# Patient Record
Sex: FEMALE | Race: WHITE | Employment: FULL TIME | URBAN - NONMETROPOLITAN AREA
[De-identification: names, ages, dates, MRNs, and addresses within clinical notes are randomized per-mention and may not be internally consistent; named-entity substitution may affect disease eponyms.]

---

## 2020-10-26 ENCOUNTER — HOSPITAL ENCOUNTER (EMERGENCY)
Facility: HOSPITAL | Age: 60
Discharge: HOME | End: 2020-10-26
Attending: EMERGENCY MEDICINE | Admitting: EMERGENCY MEDICINE
Payer: COMMERCIAL

## 2020-10-26 ENCOUNTER — APPOINTMENT (EMERGENCY)
Dept: RADIOLOGY | Facility: HOSPITAL | Age: 60
End: 2020-10-26
Payer: COMMERCIAL

## 2020-10-26 VITALS
SYSTOLIC BLOOD PRESSURE: 170 MMHG | TEMPERATURE: 98.2 F | OXYGEN SATURATION: 98 % | HEART RATE: 61 BPM | WEIGHT: 293 LBS | RESPIRATION RATE: 18 BRPM | HEIGHT: 67 IN | BODY MASS INDEX: 45.99 KG/M2 | DIASTOLIC BLOOD PRESSURE: 72 MMHG

## 2020-10-26 DIAGNOSIS — M79.642 HAND PAIN, LEFT: Primary | ICD-10-CM

## 2020-10-26 DIAGNOSIS — M79.81 ACHENBACH'S SYNDROME: ICD-10-CM

## 2020-10-26 LAB
ANION GAP SERPL CALCULATED.3IONS-SCNC: 6 MMOL/L (ref 4–13)
APTT PPP: 27 SECONDS (ref 23–37)
BASOPHILS # BLD AUTO: 0.05 THOUSANDS/ΜL (ref 0–0.1)
BASOPHILS NFR BLD AUTO: 1 % (ref 0–1)
BUN SERPL-MCNC: 15 MG/DL (ref 5–25)
CALCIUM SERPL-MCNC: 9.1 MG/DL (ref 8.3–10.1)
CHLORIDE SERPL-SCNC: 104 MMOL/L (ref 100–108)
CO2 SERPL-SCNC: 30 MMOL/L (ref 21–32)
CREAT SERPL-MCNC: 0.76 MG/DL (ref 0.6–1.3)
EOSINOPHIL # BLD AUTO: 0.19 THOUSAND/ΜL (ref 0–0.61)
EOSINOPHIL NFR BLD AUTO: 2 % (ref 0–6)
ERYTHROCYTE [DISTWIDTH] IN BLOOD BY AUTOMATED COUNT: 13.7 % (ref 11.6–15.1)
GFR SERPL CREATININE-BSD FRML MDRD: 86 ML/MIN/1.73SQ M
GLUCOSE SERPL-MCNC: 110 MG/DL (ref 65–140)
HCT VFR BLD AUTO: 43.9 % (ref 34.8–46.1)
HGB BLD-MCNC: 14.4 G/DL (ref 11.5–15.4)
IMM GRANULOCYTES # BLD AUTO: 0.04 THOUSAND/UL (ref 0–0.2)
IMM GRANULOCYTES NFR BLD AUTO: 1 % (ref 0–2)
INR PPP: 0.89 (ref 0.84–1.19)
LYMPHOCYTES # BLD AUTO: 2.47 THOUSANDS/ΜL (ref 0.6–4.47)
LYMPHOCYTES NFR BLD AUTO: 29 % (ref 14–44)
MCH RBC QN AUTO: 27.6 PG (ref 26.8–34.3)
MCHC RBC AUTO-ENTMCNC: 32.8 G/DL (ref 31.4–37.4)
MCV RBC AUTO: 84 FL (ref 82–98)
MONOCYTES # BLD AUTO: 0.59 THOUSAND/ΜL (ref 0.17–1.22)
MONOCYTES NFR BLD AUTO: 7 % (ref 4–12)
NEUTROPHILS # BLD AUTO: 5.24 THOUSANDS/ΜL (ref 1.85–7.62)
NEUTS SEG NFR BLD AUTO: 60 % (ref 43–75)
NRBC BLD AUTO-RTO: 0 /100 WBCS
PLATELET # BLD AUTO: 232 THOUSANDS/UL (ref 149–390)
PMV BLD AUTO: 12.3 FL (ref 8.9–12.7)
POTASSIUM SERPL-SCNC: 3.8 MMOL/L (ref 3.5–5.3)
PROTHROMBIN TIME: 11.9 SECONDS (ref 11.6–14.5)
RBC # BLD AUTO: 5.21 MILLION/UL (ref 3.81–5.12)
SODIUM SERPL-SCNC: 140 MMOL/L (ref 136–145)
WBC # BLD AUTO: 8.58 THOUSAND/UL (ref 4.31–10.16)

## 2020-10-26 PROCEDURE — 85025 COMPLETE CBC W/AUTO DIFF WBC: CPT | Performed by: EMERGENCY MEDICINE

## 2020-10-26 PROCEDURE — 73130 X-RAY EXAM OF HAND: CPT

## 2020-10-26 PROCEDURE — 36415 COLL VENOUS BLD VENIPUNCTURE: CPT | Performed by: EMERGENCY MEDICINE

## 2020-10-26 PROCEDURE — 99283 EMERGENCY DEPT VISIT LOW MDM: CPT

## 2020-10-26 PROCEDURE — 99285 EMERGENCY DEPT VISIT HI MDM: CPT | Performed by: EMERGENCY MEDICINE

## 2020-10-26 PROCEDURE — 80048 BASIC METABOLIC PNL TOTAL CA: CPT | Performed by: EMERGENCY MEDICINE

## 2020-10-26 PROCEDURE — 85730 THROMBOPLASTIN TIME PARTIAL: CPT | Performed by: EMERGENCY MEDICINE

## 2020-10-26 PROCEDURE — 85610 PROTHROMBIN TIME: CPT | Performed by: EMERGENCY MEDICINE

## 2021-03-10 DIAGNOSIS — Z23 ENCOUNTER FOR IMMUNIZATION: ICD-10-CM

## 2025-05-23 ENCOUNTER — APPOINTMENT (EMERGENCY)
Dept: CT IMAGING | Facility: HOSPITAL | Age: 65
DRG: 069 | End: 2025-05-23
Payer: COMMERCIAL

## 2025-05-23 ENCOUNTER — APPOINTMENT (INPATIENT)
Dept: NON INVASIVE DIAGNOSTICS | Facility: HOSPITAL | Age: 65
DRG: 069 | End: 2025-05-23
Payer: COMMERCIAL

## 2025-05-23 ENCOUNTER — HOSPITAL ENCOUNTER (INPATIENT)
Facility: HOSPITAL | Age: 65
LOS: 1 days | Discharge: HOME | DRG: 069 | End: 2025-05-24
Attending: EMERGENCY MEDICINE | Admitting: INTERNAL MEDICINE
Payer: COMMERCIAL

## 2025-05-23 ENCOUNTER — OFFICE VISIT (OUTPATIENT)
Dept: URGENT CARE | Facility: CLINIC | Age: 65
End: 2025-05-23
Payer: COMMERCIAL

## 2025-05-23 ENCOUNTER — APPOINTMENT (INPATIENT)
Dept: MRI IMAGING | Facility: HOSPITAL | Age: 65
DRG: 069 | End: 2025-05-23
Payer: COMMERCIAL

## 2025-05-23 VITALS
RESPIRATION RATE: 18 BRPM | TEMPERATURE: 97.3 F | HEART RATE: 68 BPM | OXYGEN SATURATION: 97 % | SYSTOLIC BLOOD PRESSURE: 170 MMHG | DIASTOLIC BLOOD PRESSURE: 98 MMHG

## 2025-05-23 DIAGNOSIS — R29.90 STROKE-LIKE SYMPTOMS: Primary | ICD-10-CM

## 2025-05-23 DIAGNOSIS — R20.0 RIGHT SIDED NUMBNESS: Primary | ICD-10-CM

## 2025-05-23 DIAGNOSIS — G45.9 TIA (TRANSIENT ISCHEMIC ATTACK): ICD-10-CM

## 2025-05-23 PROBLEM — E66.01 MORBID OBESITY (HCC): Status: ACTIVE | Noted: 2025-05-23

## 2025-05-23 PROBLEM — I10 HYPERTENSION: Status: ACTIVE | Noted: 2025-05-23

## 2025-05-23 LAB
2HR DELTA HS TROPONIN: 2 NG/L
4HR DELTA HS TROPONIN: 2 NG/L
ANION GAP SERPL CALCULATED.3IONS-SCNC: 9 MMOL/L (ref 4–13)
APTT PPP: 25 SECONDS (ref 23–34)
ATRIAL RATE: 67 BPM
BSA FOR ECHO PROCEDURE: 2.35 M2
BUN SERPL-MCNC: 10 MG/DL (ref 5–25)
CALCIUM SERPL-MCNC: 9.5 MG/DL (ref 8.4–10.2)
CARDIAC TROPONIN I PNL SERPL HS: 3 NG/L (ref ?–50)
CARDIAC TROPONIN I PNL SERPL HS: 5 NG/L (ref ?–50)
CARDIAC TROPONIN I PNL SERPL HS: 5 NG/L (ref ?–50)
CHLORIDE SERPL-SCNC: 103 MMOL/L (ref 96–108)
CO2 SERPL-SCNC: 26 MMOL/L (ref 21–32)
CREAT SERPL-MCNC: 0.66 MG/DL (ref 0.6–1.3)
E WAVE DECELERATION TIME: 139 MS
E/A RATIO: 0.67
ERYTHROCYTE [DISTWIDTH] IN BLOOD BY AUTOMATED COUNT: 13 % (ref 11.6–15.1)
FRACTIONAL SHORTENING: 40 (ref 28–44)
GFR SERPL CREATININE-BSD FRML MDRD: 94 ML/MIN/1.73SQ M
GLUCOSE SERPL-MCNC: 172 MG/DL (ref 65–140)
GLUCOSE SERPL-MCNC: 197 MG/DL (ref 65–140)
HCT VFR BLD AUTO: 45.3 % (ref 34.8–46.1)
HGB BLD-MCNC: 14.9 G/DL (ref 11.5–15.4)
INR PPP: 0.92 (ref 0.85–1.19)
INTERVENTRICULAR SEPTUM IN DIASTOLE (PARASTERNAL SHORT AXIS VIEW): 1.9 CM
INTERVENTRICULAR SEPTUM: 1.9 CM (ref 0.6–1.1)
LAAS-AP2: 18 CM2
LAAS-AP4: 17.1 CM2
LEFT ATRIUM AREA SYSTOLE SINGLE PLANE A4C: 16.4 CM2
LEFT ATRIUM VOLUME (MOD BIPLANE): 52 ML
LEFT ATRIUM VOLUME INDEX (MOD BIPLANE): 22.1 ML/M2
LEFT INTERNAL DIMENSION IN SYSTOLE: 2.6 CM (ref 2.1–4)
LEFT VENTRICULAR INTERNAL DIMENSION IN DIASTOLE: 4.3 CM (ref 3.5–6)
LEFT VENTRICULAR POSTERIOR WALL IN END DIASTOLE: 1.9 CM
LEFT VENTRICULAR STROKE VOLUME: 57 ML
LV EF US.2D.A4C+ESTIMATED: 71 %
LVSV (TEICH): 57 ML
MCH RBC QN AUTO: 28 PG (ref 26.8–34.3)
MCHC RBC AUTO-ENTMCNC: 32.9 G/DL (ref 31.4–37.4)
MCV RBC AUTO: 85 FL (ref 82–98)
MV E'TISSUE VEL-LAT: 10 CM/S
MV E'TISSUE VEL-SEP: 8 CM/S
MV PEAK A VEL: 0.79 M/S
MV PEAK E VEL: 53 CM/S
MV STENOSIS PRESSURE HALF TIME: 40 MS
MV VALVE AREA P 1/2 METHOD: 5.5
P AXIS: 50 DEGREES
PLATELET # BLD AUTO: 200 THOUSANDS/UL (ref 149–390)
PMV BLD AUTO: 12.5 FL (ref 8.9–12.7)
POTASSIUM SERPL-SCNC: 4.3 MMOL/L (ref 3.5–5.3)
PR INTERVAL: 204 MS
PROTHROMBIN TIME: 12.8 SECONDS (ref 12.3–15)
QRS AXIS: 29 DEGREES
QRSD INTERVAL: 106 MS
QT INTERVAL: 418 MS
QTC INTERVAL: 441 MS
RBC # BLD AUTO: 5.32 MILLION/UL (ref 3.81–5.12)
RIGHT ATRIUM AREA SYSTOLE A4C: 14.4 CM2
RIGHT VENTRICLE ID DIMENSION: 4.5 CM
SL CV LEFT ATRIUM LENGTH A2C: 4.7 CM
SL CV LV EF: 70
SL CV PED ECHO LEFT VENTRICLE DIASTOLIC VOLUME (MOD BIPLANE) 2D: 82 ML
SL CV PED ECHO LEFT VENTRICLE SYSTOLIC VOLUME (MOD BIPLANE) 2D: 25 ML
SODIUM SERPL-SCNC: 138 MMOL/L (ref 135–147)
T WAVE AXIS: 23 DEGREES
TRICUSPID ANNULAR PLANE SYSTOLIC EXCURSION: 2.7 CM
VENTRICULAR RATE: 67 BPM
WBC # BLD AUTO: 7.83 THOUSAND/UL (ref 4.31–10.16)

## 2025-05-23 PROCEDURE — 80048 BASIC METABOLIC PNL TOTAL CA: CPT | Performed by: EMERGENCY MEDICINE

## 2025-05-23 PROCEDURE — 99223 1ST HOSP IP/OBS HIGH 75: CPT | Performed by: INTERNAL MEDICINE

## 2025-05-23 PROCEDURE — 84484 ASSAY OF TROPONIN QUANT: CPT | Performed by: EMERGENCY MEDICINE

## 2025-05-23 PROCEDURE — 70551 MRI BRAIN STEM W/O DYE: CPT

## 2025-05-23 PROCEDURE — 93306 TTE W/DOPPLER COMPLETE: CPT | Performed by: INTERNAL MEDICINE

## 2025-05-23 PROCEDURE — 70496 CT ANGIOGRAPHY HEAD: CPT

## 2025-05-23 PROCEDURE — 93005 ELECTROCARDIOGRAM TRACING: CPT

## 2025-05-23 PROCEDURE — 36415 COLL VENOUS BLD VENIPUNCTURE: CPT | Performed by: EMERGENCY MEDICINE

## 2025-05-23 PROCEDURE — 85730 THROMBOPLASTIN TIME PARTIAL: CPT | Performed by: EMERGENCY MEDICINE

## 2025-05-23 PROCEDURE — 85027 COMPLETE CBC AUTOMATED: CPT | Performed by: EMERGENCY MEDICINE

## 2025-05-23 PROCEDURE — 99285 EMERGENCY DEPT VISIT HI MDM: CPT | Performed by: EMERGENCY MEDICINE

## 2025-05-23 PROCEDURE — 70498 CT ANGIOGRAPHY NECK: CPT

## 2025-05-23 PROCEDURE — 99283 EMERGENCY DEPT VISIT LOW MDM: CPT | Performed by: PHYSICIAN ASSISTANT

## 2025-05-23 PROCEDURE — 99285 EMERGENCY DEPT VISIT HI MDM: CPT

## 2025-05-23 PROCEDURE — G0382 LEV 3 HOSP TYPE B ED VISIT: HCPCS | Performed by: PHYSICIAN ASSISTANT

## 2025-05-23 PROCEDURE — 82948 REAGENT STRIP/BLOOD GLUCOSE: CPT

## 2025-05-23 PROCEDURE — 93306 TTE W/DOPPLER COMPLETE: CPT

## 2025-05-23 PROCEDURE — 84484 ASSAY OF TROPONIN QUANT: CPT | Performed by: INTERNAL MEDICINE

## 2025-05-23 PROCEDURE — 93010 ELECTROCARDIOGRAM REPORT: CPT | Performed by: INTERNAL MEDICINE

## 2025-05-23 PROCEDURE — 85610 PROTHROMBIN TIME: CPT | Performed by: EMERGENCY MEDICINE

## 2025-05-23 RX ORDER — ENOXAPARIN SODIUM 100 MG/ML
40 INJECTION SUBCUTANEOUS EVERY 12 HOURS SCHEDULED
Status: DISCONTINUED | OUTPATIENT
Start: 2025-05-23 | End: 2025-05-24 | Stop reason: HOSPADM

## 2025-05-23 RX ORDER — AMLODIPINE BESYLATE 5 MG/1
5 TABLET ORAL DAILY
Status: DISCONTINUED | OUTPATIENT
Start: 2025-05-24 | End: 2025-05-24 | Stop reason: HOSPADM

## 2025-05-23 RX ORDER — ASPIRIN 81 MG/1
81 TABLET, CHEWABLE ORAL DAILY
Status: DISCONTINUED | OUTPATIENT
Start: 2025-05-24 | End: 2025-05-23

## 2025-05-23 RX ORDER — CLOPIDOGREL BISULFATE 75 MG/1
300 TABLET ORAL ONCE
Status: COMPLETED | OUTPATIENT
Start: 2025-05-23 | End: 2025-05-23

## 2025-05-23 RX ORDER — METOPROLOL TARTRATE 25 MG/1
25 TABLET, FILM COATED ORAL EVERY 12 HOURS SCHEDULED
Status: DISCONTINUED | OUTPATIENT
Start: 2025-05-23 | End: 2025-05-24 | Stop reason: HOSPADM

## 2025-05-23 RX ORDER — ACETAMINOPHEN 325 MG/1
650 TABLET ORAL EVERY 6 HOURS PRN
Status: DISCONTINUED | OUTPATIENT
Start: 2025-05-23 | End: 2025-05-24 | Stop reason: HOSPADM

## 2025-05-23 RX ORDER — ATORVASTATIN CALCIUM 10 MG/1
10 TABLET, FILM COATED ORAL
Status: ON HOLD | COMMUNITY
End: 2025-05-24

## 2025-05-23 RX ORDER — AMLODIPINE BESYLATE 5 MG/1
5 TABLET ORAL DAILY
COMMUNITY

## 2025-05-23 RX ORDER — METOPROLOL TARTRATE 25 MG/1
25 TABLET, FILM COATED ORAL EVERY 12 HOURS
COMMUNITY
Start: 2025-02-03

## 2025-05-23 RX ORDER — ASPIRIN 81 MG/1
81 TABLET ORAL DAILY
Status: ON HOLD | COMMUNITY
Start: 2025-02-28 | End: 2025-05-24

## 2025-05-23 RX ORDER — ASPIRIN 81 MG/1
324 TABLET, CHEWABLE ORAL ONCE
Status: COMPLETED | OUTPATIENT
Start: 2025-05-23 | End: 2025-05-23

## 2025-05-23 RX ORDER — ATORVASTATIN CALCIUM 40 MG/1
40 TABLET, FILM COATED ORAL
Status: DISCONTINUED | OUTPATIENT
Start: 2025-05-23 | End: 2025-05-24 | Stop reason: HOSPADM

## 2025-05-23 RX ORDER — LORAZEPAM 2 MG/ML
1 INJECTION INTRAMUSCULAR ONCE
Status: COMPLETED | OUTPATIENT
Start: 2025-05-23 | End: 2025-05-23

## 2025-05-23 RX ORDER — ASPIRIN 81 MG/1
81 TABLET ORAL DAILY
Status: DISCONTINUED | OUTPATIENT
Start: 2025-05-24 | End: 2025-05-24 | Stop reason: HOSPADM

## 2025-05-23 RX ORDER — ENOXAPARIN SODIUM 100 MG/ML
40 INJECTION SUBCUTANEOUS DAILY
Status: DISCONTINUED | OUTPATIENT
Start: 2025-05-23 | End: 2025-05-23

## 2025-05-23 RX ORDER — ATORVASTATIN CALCIUM 40 MG/1
40 TABLET, FILM COATED ORAL EVERY EVENING
Status: DISCONTINUED | OUTPATIENT
Start: 2025-05-23 | End: 2025-05-23 | Stop reason: SDUPTHER

## 2025-05-23 RX ADMIN — ATORVASTATIN CALCIUM 40 MG: 40 TABLET, FILM COATED ORAL at 16:41

## 2025-05-23 RX ADMIN — IOHEXOL 85 ML: 350 INJECTION, SOLUTION INTRAVENOUS at 12:59

## 2025-05-23 RX ADMIN — METOPROLOL TARTRATE 25 MG: 25 TABLET, FILM COATED ORAL at 20:11

## 2025-05-23 RX ADMIN — ASPIRIN 324 MG: 81 TABLET, CHEWABLE ORAL at 13:29

## 2025-05-23 RX ADMIN — LORAZEPAM 1 MG: 2 INJECTION INTRAMUSCULAR; INTRAVENOUS at 14:05

## 2025-05-23 RX ADMIN — ENOXAPARIN SODIUM 40 MG: 40 INJECTION SUBCUTANEOUS at 20:11

## 2025-05-23 RX ADMIN — CLOPIDOGREL 300 MG: 75 TABLET ORAL at 13:29

## 2025-05-23 NOTE — ASSESSMENT & PLAN NOTE
Left voicemail message for patient to return call  Clinic number provided for call back    Attempt #2   Patient presented with transient right upper extremity numbness and slurred speech while talking to her   Symptoms resolved within 10 minutes  Neuroexam nonfocal  NIHSS on admission 0  CTA head and neckNo significant stenosis of the cervical carotid or vertebral arteries  No significant intracranial stenosis, large vessel occlusion or aneurysm.  MRI brain negative for acute stroke or bleed  Likely secondary to TIA  Discussed with neurology recommended DAPT for 21 days followed by Plavix monotherapy  Continue with high intensity statin.  Follow-up on lipid panel hemoglobin A1c.  Follow-up on echocardiogram  PT OT speech evaluation will be obtained  Neurology input noted and appreciated  Outpatient Zio patch

## 2025-05-23 NOTE — PLAN OF CARE
Problem: Neurological Deficit  Goal: Neurological status is stable or improving  Description: Interventions:  - Monitor and assess patient's level of consciousness, motor function, sensory function, and level of assistance needed for ADLs.   - Monitor and report changes from baseline. Collaborate with interdisciplinary team to initiate plan and implement interventions as ordered.   - Provide and maintain a safe environment.  - Consider seizure precautions.  - Consider fall precautions.  - Consider aspiration precautions.  - Consider bleeding precautions.  Outcome: Progressing     Problem: Communication Impairment  Goal: Ability to express needs and understand communication  Description: Assess patient's communication skills and ability to understand information.  Patient will demonstrate use of effective communication techniques, alternative methods of communication and understanding even if not able to speak.     - Encourage communication and provide alternate methods of communication as needed.  - Collaborate with case management/ for discharge needs.  - Include patient/family/caregiver in decisions related to communication.  Outcome: Progressing     Problem: Potential for Aspiration  Goal: Non-ventilated patient's risk of aspiration is minimized  Description: Assess and monitor vital signs, respiratory status, and labs (WBC).  Monitor for signs of aspiration (tachypnea, cough, rales, wheezing, cyanosis, fever).    - Assess and monitor patient's ability to swallow.  - Place patient up in chair to eat if possible.  - HOB up at 90 degrees to eat if unable to get patient up into chair.  - Supervise patient during oral intake.   - Instruct patient/ family to take small bites.  - Instruct patient/ family to take small single sips when taking liquids.  - Follow patient-specific strategies generated by speech pathologist.  Outcome: Progressing     Problem: Nutrition  Goal: Nutrition/Hydration status is  improving  Description: Monitor and assess patient's nutrition/hydration status for malnutrition (ex- brittle hair, bruises, dry skin, pale skin and conjunctiva, muscle wasting, smooth red tongue, and disorientation). Collaborate with interdisciplinary team and initiate plan and interventions as ordered.  Monitor patient's weight and dietary intake as ordered or per policy. Utilize nutrition screening tool and intervene per policy. Determine patient's food preferences and provide high-protein, high-caloric foods as appropriate.     - Assist patient with eating.  - Allow adequate time for meals.  - Encourage patient to take dietary supplement as ordered.  - Collaborate with clinical nutritionist.  - Include patient/family/caregiver in decisions related to nutrition.  Outcome: Progressing     Problem: CARDIOVASCULAR - ADULT  Goal: Maintains optimal cardiac output and hemodynamic stability  Description: INTERVENTIONS:  - Monitor I/O, vital signs and rhythm  - Monitor for S/S and trends of decreased cardiac output  - Administer and titrate ordered vasoactive medications to optimize hemodynamic stability  - Assess quality of pulses, skin color and temperature  - Assess for signs of decreased coronary artery perfusion  - Instruct patient to report change in severity of symptoms  Outcome: Progressing  Goal: Absence of cardiac dysrhythmias or at baseline rhythm  Description: INTERVENTIONS:  - Continuous cardiac monitoring, vital signs, obtain 12 lead EKG if ordered  - Administer antiarrhythmic and heart rate control medications as ordered  - Monitor electrolytes and administer replacement therapy as ordered  Outcome: Progressing

## 2025-05-23 NOTE — ED PROVIDER NOTES
Time reflects when diagnosis was documented in both MDM as applicable and the Disposition within this note       Time User Action Codes Description Comment    5/23/2025 12:36 PM Willie Mcintosh Add [R20.0] Right sided numbness     5/23/2025  1:16 PM Willie Mcintosh Add [G45.9] TIA (transient ischemic attack)           ED Disposition       ED Disposition   Admit    Condition   Stable    Date/Time   Fri May 23, 2025  1:29 PM    Comment                  Assessment & Plan       Medical Decision Making  Amount and/or Complexity of Data Reviewed  Labs: ordered. Decision-making details documented in ED Course.  Radiology: ordered. Decision-making details documented in ED Course.  ECG/medicine tests: ordered and independent interpretation performed. Decision-making details documented in ED Course.    Risk  OTC drugs.  Prescription drug management.  Decision regarding hospitalization.        ED Course as of 05/23/25 1331   Fri May 23, 2025   1328 Discussed with neurology.  Recommends admission for stroke pathway.  Blood pressures currently 192/81.       Medications   iohexol (OMNIPAQUE) 350 MG/ML injection (MULTI-DOSE) 85 mL (85 mL Intravenous Given 5/23/25 1259)   aspirin chewable tablet 324 mg (324 mg Oral Given 5/23/25 1329)   clopidogrel (PLAVIX) tablet 300 mg (300 mg Oral Given 5/23/25 1329)       ED Risk Strat Scores         Stroke Assessment       Row Name 05/23/25 1245             NIH Stroke Scale    Interval Baseline      Level of Consciousness (1a.) 0      LOC Questions (1b.) 0      LOC Commands (1c.) 0      Best Gaze (2.) 0      Visual (3.) 0      Facial Palsy (4.) 0      Motor Arm, Left (5a.) 0      Motor Arm, Right (5b.) 0      Motor Leg, Left (6a.) 0      Motor Leg, Right (6b.) 0      Limb Ataxia (7.) 0      Sensory (8.) 0      Best Language (9.) 0      Dysarthria (10.) 0      Extinction and Inattention (11.) (Formerly Neglect) 0      Total 0                              No data recorded        SBIRT 20yo+       Flowsheet Row Most Recent Value   Initial Alcohol Screen: US AUDIT-C     1. How often do you have a drink containing alcohol? 0 Filed at: 05/23/2025 1256   2. How many drinks containing alcohol do you have on a typical day you are drinking?  0 Filed at: 05/23/2025 1256   3a. Male UNDER 65: How often do you have five or more drinks on one occasion? 0 Filed at: 05/23/2025 1256   3b. FEMALE Any Age, or MALE 65+: How often do you have 4 or more drinks on one occassion? 0 Filed at: 05/23/2025 1256   Audit-C Score 0 Filed at: 05/23/2025 1256   KIERRA: How many times in the past year have you...    Used an illegal drug or used a prescription medication for non-medical reasons? Never Filed at: 05/23/2025 1316                            History of Present Illness       Chief Complaint   Patient presents with    STROKE Alert     Pt started with R sided headache, R arm pain, R hadn pain. Slurred speech. Pt went to  and they called 911. When EMS arrived all symptoms have resolved        Past Medical History[1]   Past Surgical History[2]   Family History[3]   Social History[4]   E-Cigarette/Vaping      E-Cigarette/Vaping Substances      I have reviewed and agree with the history as documented.     Patient was sitting on the desk when she suddenly developed numbness in the right hand.  Had trouble speaking.  Had right arm numbness.  Had a slight headache.  No nausea or vomiting.  No trauma.  No blood thinners.  No history of same.  On arrival here symptoms have resolved.  States her speech is back to normal.      History provided by:  Patient   used: No    CVA/TIA-like Symptoms  Presenting symptoms: language symptoms and sensory loss    Presenting symptoms: no headaches    Date/time of last known well:  5/23/2025 10:30 AM  Onset quality:  Sudden  Timing:  Constant  Progression:  Resolved  Similar to previous episodes: no    Associated symptoms: no chest pain, no trouble swallowing, no dizziness, no fever,  no hearing loss, no nausea, no neck pain, no seizures, no tinnitus, no vertigo and no vomiting        Review of Systems   Constitutional:  Negative for chills and fever.   HENT:  Negative for ear pain, hearing loss, sore throat, tinnitus, trouble swallowing and voice change.    Eyes:  Negative for pain and discharge.   Respiratory:  Negative for cough, shortness of breath and wheezing.    Cardiovascular:  Negative for chest pain and palpitations.   Gastrointestinal:  Negative for abdominal pain, blood in stool, constipation, diarrhea, nausea and vomiting.   Genitourinary:  Negative for dysuria, flank pain, frequency and hematuria.   Musculoskeletal:  Negative for joint swelling, neck pain and neck stiffness.   Skin:  Negative for rash and wound.   Neurological:  Negative for dizziness, vertigo, seizures, syncope, facial asymmetry and headaches.   Psychiatric/Behavioral:  Negative for hallucinations, self-injury and suicidal ideas.    All other systems reviewed and are negative.          Objective       ED Triage Vitals [05/23/25 1245]   Temperature Pulse Blood Pressure Respirations SpO2 Patient Position - Orthostatic VS   98.5 °F (36.9 °C) 75 (!) 205/107 18 97 % --      Temp Source Heart Rate Source BP Location FiO2 (%) Pain Score    Tympanic Monitor -- -- No Pain      Vitals      Date and Time Temp Pulse SpO2 Resp BP Pain Score FACES Pain Rating User   05/23/25 1315 -- 78 96 % 18 203/86 -- -- KW   05/23/25 1300 -- -- -- -- -- No Pain -- RR   05/23/25 1300 -- 80 98 % 18 203/86 -- -- LAK   05/23/25 1245 98.5 °F (36.9 °C) 75 97 % 18 205/107 No Pain -- LAK            Physical Exam  Vitals and nursing note reviewed.   Constitutional:       General: She is not in acute distress.     Appearance: She is well-developed.   HENT:      Head: Normocephalic and atraumatic.      Right Ear: External ear normal.      Left Ear: External ear normal.     Eyes:      General: No scleral icterus.        Right eye: No discharge.          Left eye: No discharge.      Extraocular Movements: Extraocular movements intact.      Conjunctiva/sclera: Conjunctivae normal.       Cardiovascular:      Rate and Rhythm: Normal rate and regular rhythm.      Heart sounds: Normal heart sounds. No murmur heard.  Pulmonary:      Effort: Pulmonary effort is normal.      Breath sounds: Normal breath sounds. No wheezing or rales.   Abdominal:      General: Bowel sounds are normal. There is no distension.      Palpations: Abdomen is soft.      Tenderness: There is no abdominal tenderness. There is no guarding or rebound.     Musculoskeletal:         General: No deformity. Normal range of motion.      Cervical back: Normal range of motion and neck supple.     Skin:     General: Skin is warm and dry.      Findings: No rash.     Neurological:      General: No focal deficit present.      Mental Status: She is alert and oriented to person, place, and time.      Cranial Nerves: No cranial nerve deficit.     Psychiatric:         Mood and Affect: Mood normal.         Behavior: Behavior normal.         Thought Content: Thought content normal.         Judgment: Judgment normal.         Results Reviewed       Procedure Component Value Units Date/Time    Protime-INR [[id number]] Collected: 05/23/25 1324    Lab Status: In process Specimen: Blood from Arm, Right Updated: 05/23/25 1326    APTT [[id number]] Collected: 05/23/25 1324    Lab Status: In process Specimen: Blood from Arm, Right Updated: 05/23/25 1326    HS Troponin 0hr (reflex protocol) [[id number]]  (Normal) Collected: 05/23/25 1252    Lab Status: Final result Specimen: Blood from Arm, Right Updated: 05/23/25 1323     hs TnI 0hr 3 ng/L     HS Troponin I 2hr [[id number]]     Lab Status: No result Specimen: Blood     Basic metabolic panel [[id number]]  (Abnormal) Collected: 05/23/25 1252    Lab Status: Final result Specimen: Blood from Arm, Right Updated: 05/23/25 1320     Sodium 138 mmol/L      Potassium 4.3 mmol/L      Chloride  103 mmol/L      CO2 26 mmol/L      ANION GAP 9 mmol/L      BUN 10 mg/dL      Creatinine 0.66 mg/dL      Glucose 197 mg/dL      Calcium 9.5 mg/dL      eGFR 94 ml/min/1.73sq m     Narrative:      National Kid[employer] Foundation guidelines for Chronic Kidney Disease (CKD):     Stage 1 with normal or high GFR (GFR > 90 mL/min/1.73 square meters)    Stage 2 Mild CKD (GFR = 60-89 mL/min/1.73 square meters)    Stage 3A Moderate CKD (GFR = 45-59 mL/min/1.73 square meters)    Stage 3B Moderate CKD (GFR = 30-44 mL/min/1.73 square meters)    Stage 4 Severe CKD (GFR = 15-29 mL/min/1.73 square meters)    Stage 5 End Stage CKD (GFR <15 mL/min/1.73 square meters)  Note: GFR calculation is accurate only with a steady state creatinine    CBC and Platelet [[id number]]  (Abnormal) Collected: 05/23/25 1311    Lab Status: Final result Specimen: Blood from Arm, Left Updated: 05/23/25 1315     WBC 7.83 Thousand/uL      RBC 5.32 Million/uL      Hemoglobin 14.9 g/dL      Hematocrit 45.3 %      MCV 85 fL      MCH 28.0 pg      MCHC 32.9 g/dL      RDW 13.0 %      Platelets 200 Thousands/uL      MPV 12.5 fL     Fingerstick Glucose (POCT) [[id number]]  (Abnormal) Collected: 05/23/25 1242    Lab Status: Final result Specimen: Blood Updated: 05/23/25 1243     POC Glucose 172 mg/dl             CT stroke alert brain   Final Interpretation by E. Alec Schoenberger, MD (05/23 6815)   No acute infarct, hemorrhage or mass.   Chronic appearing lacunar infarcts and mild chronic microangiopathy.      Findings were reported to Sherrie Oneal   via HIPAA compliant secure electronic messaging at 12:50 p.m.         Workstation performed: [id number]         CTA stroke alert (head/neck)   Final Interpretation by E. Alec Schoenberger, MD (05/23 0153)   No significant stenosis of the cervical carotid or vertebral arteries   No significant intracranial stenosis, large vessel occlusion or aneurysm.         Findings were directly discussed with Sherrie Oneal  at  1:05 p.m.      Workstation performed: [id number]             ECG 12 Lead Documentation Only    Date/Time: 5/23/2025 1:27 PM    Performed by: Willie Mcintosh MD  Authorized by: Willie Mcintosh MD    ECG reviewed by me, the ED Provider: yes    Patient location:  ED  Rate:     ECG rate:  70  Rhythm:     Rhythm: sinus rhythm    Ectopy:     Ectopy: none    QRS:     QRS axis:  Normal      ED Medication and Procedure Management   Prior to Admission Medications   Prescriptions Last Dose Informant Patient Reported? Taking?   Cholecalciferol 50 MCG (2000 UT) TABS   Yes No   Sig: Take by mouth daily   amLODIPine (NORVASC) 5 mg tablet   Yes No   Sig: Take 5 mg by mouth daily   aspirin (ECOTRIN LOW STRENGTH) 81 mg EC tablet   Yes No   Sig: Take 81 mg by mouth daily   atorvastatin (LIPITOR) 10 mg tablet   Yes No   Sig: Take by mouth   metoprolol tartrate (LOPRESSOR) 25 mg tablet   Yes No   Sig: Take 25 mg by mouth every 12 (twelve) hours      Facility-Administered Medications: None     Patient's Medications   Discharge Prescriptions    No medications on file     No discharge procedures on file.  ED SEPSIS DOCUMENTATION   Time reflects when diagnosis was documented in both MDM as applicable and the Disposition within this note       Time User Action Codes Description Comment    5/23/2025 12:36 PM Willie Mcintosh [R20.0] Right sided numbness     5/23/2025  1:16 PM Willie Mcintosh [G45.9] TIA (transient ischemic attack)                      [1]   Past Medical History:  Diagnosis Date    Arthritis     Hypertension    [2]   Past Surgical History:  Procedure Laterality Date    CORONARY ANGIOPLASTY WITH STENT PLACEMENT      EYE SURGERY      Narrow angles   [3] No family history on file.  [4]   Social History  Tobacco Use    Smoking status: Never    Smokeless tobacco: Never   Substance Use Topics    Alcohol use: Yes    Drug use: Never        Willie Mcintosh MD  05/23/25 8164

## 2025-05-23 NOTE — PROGRESS NOTES
[facility]        NAME: Krys Carson is a 64 y.o. female  : 1960    MRN: [id number]  DATE: May 23, 2025  TIME: 3:49 PM    Assessment and Plan   Stroke-like symptoms [R29.90]  1. Stroke-like symptoms          NIH stroke scale- 0 during assessment  Advised patient to proceed to the emergency given the symptoms for higher level of care and evaluation.  Called the ambulance and transfer to the ED.  Patient Instructions       Proceed to  ER   Chief Complaint     Chief Complaint   Patient presents with    Slurred Speech     Hour ago  pt experienced slurred speech and numbness . Mild headache.          History of Present Illness          Patient is here with c/o R sided headache, R arm pain, R hadn pain. Slurred speech.           Review of Systems   Review of Systems   Respiratory:  Negative for shortness of breath.    Cardiovascular:  Negative for chest pain and palpitations.   Musculoskeletal:  Negative for arthralgias, back pain, gait problem, joint swelling, neck pain and neck stiffness.   Skin:  Negative for rash.   Neurological:  Positive for speech difficulty, weakness, numbness and headaches. Negative for dizziness, tremors, seizures, syncope and light-headedness.         Current Medications     Current Medications[1]    Current Allergies     Allergies as of 2025    (No Known Allergies)            The following portions of the patient's history were reviewed and updated as appropriate: allergies, current medications, past family history, past medical history, past social history, past surgical history and problem list.     Past Medical History[2]    Past Surgical History[3]    Family History[4]      Medications have been verified.        Objective   /98   Pulse 68   Temp (!) 97.3 °F (36.3 °C)   Resp 18   SpO2 97%   No LMP recorded. Patient is postmenopausal.       Physical Exam     Physical Exam  Vitals and nursing note reviewed.   Constitutional:       Appearance: Normal  appearance. She is normal weight.   HENT:      Head: Normocephalic and atraumatic.      Right Ear: External ear normal.      Left Ear: External ear normal.      Nose: Nose normal.      Mouth/Throat:      Mouth: Mucous membranes are moist.      Pharynx: Oropharynx is clear.     Eyes:      Extraocular Movements: Extraocular movements intact.      Conjunctiva/sclera: Conjunctivae normal.      Pupils: Pupils are equal, round, and reactive to light.       Cardiovascular:      Rate and Rhythm: Normal rate and regular rhythm.      Pulses: Normal pulses.      Heart sounds: Normal heart sounds.   Pulmonary:      Effort: Pulmonary effort is normal.      Breath sounds: Normal breath sounds.   Abdominal:      General: Abdomen is flat. Bowel sounds are normal.      Palpations: Abdomen is soft.     Musculoskeletal:         General: Normal range of motion.      Cervical back: Normal range of motion and neck supple.     Skin:     General: Skin is warm and dry.      Capillary Refill: Capillary refill takes less than 2 seconds.     Neurological:      General: No focal deficit present.      Mental Status: She is alert and oriented to person, place, and time.                          [1] No current facility-administered medications for this visit.  No current outpatient medications on file.    Facility-Administered Medications Ordered in Other Visits:     [START ON 5/24/2025] amLODIPine (NORVASC) tablet 5 mg, 5 mg, Oral, Daily, Matilde Woodruff MD    [START ON 5/24/2025] aspirin (ECOTRIN LOW STRENGTH) EC tablet 81 mg, 81 mg, Oral, Daily, Matilde Woodruff MD    atorvastatin (LIPITOR) tablet 40 mg, 40 mg, Oral, Daily With Dinner, Matilde Woodruff MD    Cholecalciferol (VITAMIN D3) tablet 2,000 Units, 2,000 Units, Oral, Daily, Matilde Woodruff MD    enoxaparin (LOVENOX) subcutaneous injection 40 mg, 40 mg, Subcutaneous, Q12H Count includes the [facility]    metoprolol tartrate (LOPRESSOR) tablet 25 mg, 25 mg, Oral, Q12H Count includes the [facility], Matilde Woodruff MD  [2]    Past Medical History:  Diagnosis Date    Arthritis     Hypertension    [3]   Past Surgical History:  Procedure Laterality Date    CORONARY ANGIOPLASTY WITH STENT PLACEMENT      EYE SURGERY      Narrow angles   [4] No family history on file.

## 2025-05-23 NOTE — TELEMEDICINE
e-Consult (IPC) - Neurology   Name: Krys Carson 64 y.o. female I MRN: [id number]  Unit/Bed#: TR 13B I Date of Admission: 5/23/2025   Date of Service: 5/23/2025 I Hospital Day: 0   Consult to Neurology  Consult performed by: Sherrie Oneal MD  Consult ordered by: Willie Mcintosh MD        Physician Requesting Evaluation: Matilde Woodruff MD   Reason for Evaluation / Principal Problem: stroke alert    ASSESSMENT:    Paged at 12:26pm  Responded at 12:26pm    NIH = 0    Last known normal 45 minutes prior to arrival   This is a 65 y/o  Female with history of hypertension, not on anti-platelet therapy at home who is here as a stroke alert with R sided headache, R arm pain, and right hand pain while she was at work today, symptoms started 40-45 minutes prior to ER arrival, but by the time she arrived to the ER her symptoms resolved completely. Her NIHSS = 0. Patient not on Antiplatelet therapy at home. She still has a slight headache otherwise no other issues.     CT head shows no acute findings.    CTA head and neck does not show any LVO.     Active Ambulatory Problems     Diagnosis Date Noted    No Active Ambulatory Problems     Resolved Ambulatory Problems     Diagnosis Date Noted    No Resolved Ambulatory Problems     Past Medical History:   Diagnosis Date    Arthritis     Hypertension          Stroke Assessment       Row Name 05/23/25 1245             NIH Stroke Scale    Interval Baseline      Level of Consciousness (1a.) 0      LOC Questions (1b.) 0      LOC Commands (1c.) 0      Best Gaze (2.) 0      Visual (3.) 0      Facial Palsy (4.) 0      Motor Arm, Left (5a.) 0      Motor Arm, Right (5b.) 0      Motor Leg, Left (6a.) 0      Motor Leg, Right (6b.) 0      Limb Ataxia (7.) 0      Sensory (8.) 0      Best Language (9.) 0      Dysarthria (10.) 0      Extinction and Inattention (11.) (Formerly Neglect) 0      Total 0                     RECOMMENDATIONS:  Patient not a tnk candidate given her low  NIHSS of 0. CT head is negative. CTA head no LVO, not a candidate for mechanical thrombectomy as well. Differential diagnosis includes TIA/CVA.  She does have vascular RF such as HTN.     PLAN:  Recommend stroke pathway admission  Mri brain  Echo  PT/OT  DAPT for 3 weeks, and then aspirin monotherapy afterwards  If echo negative, zio patch     Neuro follow up outpatient in 6-8 weeks, (30 minutes), ok to see vascular A/p or Vascular attending.        21-30 minutes, >50% of the total time devoted to medical consultative verbal/EMR discussion between providers. Written report will be generated in the EMR.

## 2025-05-23 NOTE — ASSESSMENT & PLAN NOTE
Blood pressure elevated on admission.  Now that acute stroke has been ruled out, will continue with amlodipine and metoprolol.  May need further titration of medications.

## 2025-05-23 NOTE — PROGRESS NOTES
Patient arrived to unit after MRI completed, NIH and Dysphagia screening started as ordered on arrival to unit.

## 2025-05-23 NOTE — H&P
H&P - Hospitalist   Name: Krys Carson 64 y.o. female I MRN: [id number]  Unit/Bed#: -01 I Date of Admission: 5/23/2025   Date of Service: 5/23/2025 I Hospital Day: 0     Assessment & Plan  Stroke-like symptoms  Patient presented with transient right upper extremity numbness and slurred speech while talking to her   Symptoms resolved within 10 minutes  Neuroexam nonfocal  NIHSS on admission 0  CTA head and neckNo significant stenosis of the cervical carotid or vertebral arteries  No significant intracranial stenosis, large vessel occlusion or aneurysm.  MRI brain negative for acute stroke or bleed  Likely secondary to TIA  Discussed with neurology recommended DAPT for 21 days followed by Plavix monotherapy  Continue with high intensity statin.  Follow-up on lipid panel hemoglobin A1c.  Follow-up on echocardiogram  PT OT speech evaluation will be obtained  Neurology input noted and appreciated  Outpatient Zio patch  Morbid obesity (HCC)  Impacts all aspects of health care  Hypertension  Blood pressure elevated on admission.  Now that acute stroke has been ruled out, will continue with amlodipine and metoprolol.  May need further titration of medications.      VTE Pharmacologic Prophylaxis: VTE Score: 8 High Risk (Score >/= 5) - Pharmacological DVT Prophylaxis Ordered: enoxaparin (Lovenox). Sequential Compression Devices Ordered.  Code Status: Level 1 - Full Code   Discussion with family: Updated  () at bedside.    Anticipated Length of Stay: Patient will be admitted on an inpatient basis with an anticipated length of stay of greater than 2 midnights secondary to stroke workup as documented above.    History of Present Illness   Chief Complaint: Strokelike symptoms    Krys Carson is a 64 y.o. female with a PMH of hypertension, hyperlipidemia who presents with strokelike symptoms.  Patient reported that this morning at around 10:30 AM she was sitting down talking to her   when she suddenly felt numbness starting from her fingers going up her arm and her right half of the face.  She also had slurring of speech alongside with that.  This episode lasted for 10 minutes and subsequently started getting better.  Symptoms have resolved by the time patient came to the emergency room.  She initially went to urgent care and was sent to the emergency room as a prehospital stroke alert.  Patient also had some dull headache subsequently.  Currently denies any facial numbness, slurred speech, weakness of any extremity.  Currently denies any headache or blurring of vision.  Denies any chest pain or shortness of breath..    Review of Systems   Constitutional: Negative.    HENT: Negative.     Respiratory: Negative.     Cardiovascular: Negative.    Gastrointestinal: Negative.    Endocrine: Negative.    Genitourinary: Negative.    Musculoskeletal: Negative.    Neurological:  Positive for speech difficulty and numbness.   Hematological: Negative.    Psychiatric/Behavioral: Negative.         Historical Information   Past Medical History[1]  Past Surgical History[2]  Social History[3]  E-Cigarette/Vaping     E-Cigarette/Vaping Substances       Social History:  Marital Status: /Civil Union   Meds/Allergies   I have reviewed home medications with patient personally.  Prior to Admission medications    Medication Sig Start Date End Date Taking? Authorizing Provider   amLODIPine (NORVASC) 5 mg tablet Take 5 mg by mouth daily   Yes Historical Provider, MD   aspirin (ECOTRIN LOW STRENGTH) 81 mg EC tablet Take 81 mg by mouth daily 2/28/25  Yes Historical Provider, MD   atorvastatin (LIPITOR) 10 mg tablet Take 10 mg by mouth in the evening. Take before meals   Yes Historical Provider, MD   Cholecalciferol 50 MCG (2000 UT) TABS Take 2,000 Units by mouth daily   Yes Historical Provider, MD   metoprolol tartrate (LOPRESSOR) 25 mg tablet Take 25 mg by mouth every 12 (twelve) hours 2/3/25  Yes Historical  "Provider, MD     No Known Allergies    Objective :  Temp:  [97.3 °F (36.3 °C)-98.5 °F (36.9 °C)] 97.3 °F (36.3 °C)  HR:  [68-80] 80  BP: (147-205)/() 149/78  Resp:  [18-20] 18  SpO2:  [96 %-98 %] 97 %  O2 Device: None (Room air)    Physical Exam  Constitutional:       Appearance: She is obese.   HENT:      Head: Normocephalic.      Nose: Nose normal.      Mouth/Throat:      Mouth: Mucous membranes are moist.     Eyes:      Pupils: Pupils are equal, round, and reactive to light.       Cardiovascular:      Rate and Rhythm: Normal rate and regular rhythm.   Pulmonary:      Effort: Pulmonary effort is normal.   Abdominal:      General: Abdomen is flat.     Musculoskeletal:      Right lower leg: No edema.      Left lower leg: No edema.     Neurological:      General: No focal deficit present.      Mental Status: She is alert and oriented to person, place, and time. Mental status is at baseline.      Cranial Nerves: No cranial nerve deficit.      Motor: No weakness.      Gait: Gait normal.     Psychiatric:         Mood and Affect: Mood normal.          Lines/Drains:            Lab Results: I have reviewed the following results:  Results from last 7 days   Lab Units 05/23/25  1311   WBC Thousand/uL 7.83   HEMOGLOBIN g/dL 14.9   HEMATOCRIT % 45.3   PLATELETS Thousands/uL 200     Results from last 7 days   Lab Units 05/23/25  1252   SODIUM mmol/L 138   POTASSIUM mmol/L 4.3   CHLORIDE mmol/L 103   CO2 mmol/L 26   BUN mg/dL 10   CREATININE mg/dL 0.66   ANION GAP mmol/L 9   CALCIUM mg/dL 9.5   GLUCOSE RANDOM mg/dL 197*     Results from last 7 days   Lab Units 05/23/25  1324   INR  0.92     Results from last 7 days   Lab Units 05/23/25  1242   POC GLUCOSE mg/dl 172*     No results found for: \"HGBA1C\"        Imaging Results Review: I reviewed radiology reports from this admission including: MRI brain.  Other Study Results Review: EKG was reviewed.     Administrative Statements       ** Please Note: This note has been " constructed using a voice recognition system. **         [1]   Past Medical History:  Diagnosis Date    Arthritis     Hypertension    [2]   Past Surgical History:  Procedure Laterality Date    CORONARY ANGIOPLASTY WITH STENT PLACEMENT      EYE SURGERY      Narrow angles   [3]   Social History  Tobacco Use    Smoking status: Never    Smokeless tobacco: Never   Substance and Sexual Activity    Alcohol use: Yes    Drug use: Never

## 2025-05-24 VITALS
OXYGEN SATURATION: 91 % | TEMPERATURE: 97.5 F | WEIGHT: 285 LBS | HEART RATE: 54 BPM | BODY MASS INDEX: 44.73 KG/M2 | SYSTOLIC BLOOD PRESSURE: 140 MMHG | RESPIRATION RATE: 16 BRPM | HEIGHT: 67 IN | DIASTOLIC BLOOD PRESSURE: 75 MMHG

## 2025-05-24 LAB
ANION GAP SERPL CALCULATED.3IONS-SCNC: 6 MMOL/L (ref 4–13)
BASOPHILS # BLD AUTO: 0.05 THOUSANDS/ÂΜL (ref 0–0.1)
BASOPHILS NFR BLD AUTO: 1 % (ref 0–1)
BUN SERPL-MCNC: 13 MG/DL (ref 5–25)
CALCIUM SERPL-MCNC: 8.7 MG/DL (ref 8.4–10.2)
CHLORIDE SERPL-SCNC: 105 MMOL/L (ref 96–108)
CHOLEST SERPL-MCNC: 155 MG/DL (ref ?–200)
CO2 SERPL-SCNC: 27 MMOL/L (ref 21–32)
CREAT SERPL-MCNC: 0.57 MG/DL (ref 0.6–1.3)
EOSINOPHIL # BLD AUTO: 0.19 THOUSAND/ÂΜL (ref 0–0.61)
EOSINOPHIL NFR BLD AUTO: 3 % (ref 0–6)
ERYTHROCYTE [DISTWIDTH] IN BLOOD BY AUTOMATED COUNT: 13.2 % (ref 11.6–15.1)
EST. AVERAGE GLUCOSE BLD GHB EST-MCNC: 209 MG/DL
GFR SERPL CREATININE-BSD FRML MDRD: 98 ML/MIN/1.73SQ M
GLUCOSE SERPL-MCNC: 179 MG/DL (ref 65–140)
HBA1C MFR BLD: 8.9 %
HCT VFR BLD AUTO: 41 % (ref 34.8–46.1)
HDLC SERPL-MCNC: 40 MG/DL
HGB BLD-MCNC: 13.6 G/DL (ref 11.5–15.4)
IMM GRANULOCYTES # BLD AUTO: 0.02 THOUSAND/UL (ref 0–0.2)
IMM GRANULOCYTES NFR BLD AUTO: 0 % (ref 0–2)
LDLC SERPL CALC-MCNC: 85 MG/DL (ref 0–100)
LYMPHOCYTES # BLD AUTO: 2.68 THOUSANDS/ÂΜL (ref 0.6–4.47)
LYMPHOCYTES NFR BLD AUTO: 37 % (ref 14–44)
MCH RBC QN AUTO: 28.4 PG (ref 26.8–34.3)
MCHC RBC AUTO-ENTMCNC: 33.2 G/DL (ref 31.4–37.4)
MCV RBC AUTO: 86 FL (ref 82–98)
MONOCYTES # BLD AUTO: 0.42 THOUSAND/ÂΜL (ref 0.17–1.22)
MONOCYTES NFR BLD AUTO: 6 % (ref 4–12)
NEUTROPHILS # BLD AUTO: 3.95 THOUSANDS/ÂΜL (ref 1.85–7.62)
NEUTS SEG NFR BLD AUTO: 53 % (ref 43–75)
NRBC BLD AUTO-RTO: 0 /100 WBCS
PLATELET # BLD AUTO: 191 THOUSANDS/UL (ref 149–390)
PMV BLD AUTO: 12.7 FL (ref 8.9–12.7)
POTASSIUM SERPL-SCNC: 4 MMOL/L (ref 3.5–5.3)
RBC # BLD AUTO: 4.79 MILLION/UL (ref 3.81–5.12)
SODIUM SERPL-SCNC: 138 MMOL/L (ref 135–147)
TRIGL SERPL-MCNC: 150 MG/DL (ref ?–150)
WBC # BLD AUTO: 7.31 THOUSAND/UL (ref 4.31–10.16)

## 2025-05-24 PROCEDURE — 83036 HEMOGLOBIN GLYCOSYLATED A1C: CPT | Performed by: INTERNAL MEDICINE

## 2025-05-24 PROCEDURE — 80061 LIPID PANEL: CPT | Performed by: INTERNAL MEDICINE

## 2025-05-24 PROCEDURE — 99239 HOSP IP/OBS DSCHRG MGMT >30: CPT | Performed by: INTERNAL MEDICINE

## 2025-05-24 PROCEDURE — 80048 BASIC METABOLIC PNL TOTAL CA: CPT | Performed by: INTERNAL MEDICINE

## 2025-05-24 PROCEDURE — 85025 COMPLETE CBC W/AUTO DIFF WBC: CPT | Performed by: INTERNAL MEDICINE

## 2025-05-24 RX ORDER — CLOPIDOGREL BISULFATE 75 MG/1
75 TABLET ORAL DAILY
Qty: 30 TABLET | Refills: 0 | Status: SHIPPED | OUTPATIENT
Start: 2025-05-24 | End: 2025-06-23

## 2025-05-24 RX ORDER — CLOPIDOGREL BISULFATE 75 MG/1
75 TABLET ORAL ONCE
Status: COMPLETED | OUTPATIENT
Start: 2025-05-24 | End: 2025-05-24

## 2025-05-24 RX ORDER — ATORVASTATIN CALCIUM 40 MG/1
40 TABLET, FILM COATED ORAL DAILY
Qty: 30 TABLET | Refills: 0 | Status: SHIPPED | OUTPATIENT
Start: 2025-05-24

## 2025-05-24 RX ORDER — ASPIRIN 81 MG/1
81 TABLET ORAL DAILY
Qty: 20 TABLET | Refills: 0 | Status: SHIPPED | OUTPATIENT
Start: 2025-05-24 | End: 2025-06-13

## 2025-05-24 RX ADMIN — AMLODIPINE BESYLATE 5 MG: 5 TABLET ORAL at 08:38

## 2025-05-24 RX ADMIN — ASPIRIN 81 MG: 81 TABLET, COATED ORAL at 08:38

## 2025-05-24 RX ADMIN — Medication 2000 UNITS: at 08:38

## 2025-05-24 RX ADMIN — ENOXAPARIN SODIUM 40 MG: 40 INJECTION SUBCUTANEOUS at 08:38

## 2025-05-24 RX ADMIN — CLOPIDOGREL 75 MG: 75 TABLET ORAL at 12:13

## 2025-05-24 RX ADMIN — METOPROLOL TARTRATE 25 MG: 25 TABLET, FILM COATED ORAL at 08:38

## 2025-05-24 NOTE — PLAN OF CARE
Problem: Potential for Falls  Goal: Patient will remain free of falls  Description: INTERVENTIONS:  - Educate patient/family on patient safety including physical limitations  - Instruct patient to call for assistance with activity   - Consider consulting OT/PT to assist with strengthening/mobility based on AM PAC & JH-HLM score  - Consult OT/PT to assist with strengthening/mobility   - Keep Call bell within reach  - Keep bed low and locked with side rails adjusted as appropriate  - Keep care items and personal belongings within reach  - Initiate and maintain comfort rounds  - Make Fall Risk Sign visible to staff  - Offer Toileting every  Hours, in advance of need  - Initiate/Maintain alarm  - Obtain necessary fall risk management equipment:  - Apply yellow socks and bracelet for high fall risk patients  - Consider moving patient to room near nurses station  Outcome: Progressing     Problem: Neurological Deficit  Goal: Neurological status is stable or improving  Description: Interventions:  - Monitor and assess patient's level of consciousness, motor function, sensory function, and level of assistance needed for ADLs.   - Monitor and report changes from baseline. Collaborate with interdisciplinary team to initiate plan and implement interventions as ordered.   - Provide and maintain a safe environment.  - Consider seizure precautions.  - Consider fall precautions.  - Consider aspiration precautions.  - Consider bleeding precautions.  Outcome: Progressing     Problem: Activity Intolerance/Impaired Mobility  Goal: Mobility/activity is maintained at optimum level for patient  Description: Interventions:  - Assess and monitor patient  barriers to mobility and need for assistive/adaptive devices.  - Assess patient's emotional response to limitations.  - Collaborate with interdisciplinary team and initiate plans and interventions as ordered.  - Encourage independent activity per ability.  - Maintain proper body  alignment.  - Perform active/passive rom as tolerated/ordered.  - Plan activities to conserve energy.  - Turn patient as appropriate  Outcome: Progressing     Problem: Communication Impairment  Goal: Ability to express needs and understand communication  Description: Assess patient's communication skills and ability to understand information.  Patient will demonstrate use of effective communication techniques, alternative methods of communication and understanding even if not able to speak.     - Encourage communication and provide alternate methods of communication as needed.  - Collaborate with case management/ for discharge needs.  - Include patient/family/caregiver in decisions related to communication.  Outcome: Progressing     Problem: Potential for Aspiration  Goal: Non-ventilated patient's risk of aspiration is minimized  Description: Assess and monitor vital signs, respiratory status, and labs (WBC).  Monitor for signs of aspiration (tachypnea, cough, rales, wheezing, cyanosis, fever).    - Assess and monitor patient's ability to swallow.  - Place patient up in chair to eat if possible.  - HOB up at 90 degrees to eat if unable to get patient up into chair.  - Supervise patient during oral intake.   - Instruct patient/ family to take small bites.  - Instruct patient/ family to take small single sips when taking liquids.  - Follow patient-specific strategies generated by speech pathologist.  Outcome: Progressing     Problem: Nutrition  Goal: Nutrition/Hydration status is improving  Description: Monitor and assess patient's nutrition/hydration status for malnutrition (ex- brittle hair, bruises, dry skin, pale skin and conjunctiva, muscle wasting, smooth red tongue, and disorientation). Collaborate with interdisciplinary team and initiate plan and interventions as ordered.  Monitor patient's weight and dietary intake as ordered or per policy. Utilize nutrition screening tool and intervene per  policy. Determine patient's food preferences and provide high-protein, high-caloric foods as appropriate.     - Assist patient with eating.  - Allow adequate time for meals.  - Encourage patient to take dietary supplement as ordered.  - Collaborate with clinical nutritionist.  - Include patient/family/caregiver in decisions related to nutrition.  Outcome: Progressing     Problem: CARDIOVASCULAR - ADULT  Goal: Maintains optimal cardiac output and hemodynamic stability  Description: INTERVENTIONS:  - Monitor I/O, vital signs and rhythm  - Monitor for S/S and trends of decreased cardiac output  - Administer and titrate ordered vasoactive medications to optimize hemodynamic stability  - Assess quality of pulses, skin color and temperature  - Assess for signs of decreased coronary artery perfusion  - Instruct patient to report change in severity of symptoms  Outcome: Progressing  Goal: Absence of cardiac dysrhythmias or at baseline rhythm  Description: INTERVENTIONS:  - Continuous cardiac monitoring, vital signs, obtain 12 lead EKG if ordered  - Administer antiarrhythmic and heart rate control medications as ordered  - Monitor electrolytes and administer replacement therapy as ordered  Outcome: Progressing

## 2025-05-24 NOTE — DISCHARGE SUMMARY
Discharge Summary - Hospitalist   Name: Krys Carson 64 y.o. female I MRN: [id number]  Unit/Bed#: -01 I Date of Admission: 5/23/2025   Date of Service: 5/24/2025 I Hospital Day: 1     Assessment & Plan  Stroke-like symptoms  Patient presented with transient right upper extremity numbness and slurred speech while talking to her   Symptoms resolved within 10 minutes  Neuroexam nonfocal  NIHSS on admission 0  CTA head and neckNo significant stenosis of the cervical carotid or vertebral arteries  No significant intracranial stenosis, large vessel occlusion or aneurysm.  MRI brain negative for acute stroke or bleed  Symptoms likely secondary to TIA  Discussed with neurology recommended DAPT for 21 days followed by Plavix monotherapy  Continue with high intensity statin.   Echocardiogram with small PFO with right-to-left shunt   Ambulated well with PT --no needs  Neurology input noted and appreciated  Outpatient Zio patch.  Discussed with cardiology.  Referral provided upon discharge  Stable for discharge home today  Morbid obesity (HCC)  Impacts all aspects of health care  Hypertension  Blood pressure elevated on admission.  Now that acute stroke has been ruled out, will continue with amlodipine and metoprolol.  May need further titration of medications.  Blood pressure currently well-controlled     Medical Problems       Resolved Problems  Date Reviewed: 5/23/2025   None       Discharging Physician / Practitioner: Matilde Woodruff MD  PCP: Ranjit Diaz MD  Admission Date:   Admission Orders (From admission, onward)       Ordered        05/23/25 1331  INPATIENT ADMISSION  Once                          Discharge Date: 05/24/25    Next Steps for Physician/AP Assuming Care:  Cardiology follow-up for PFO and Zio patch  Test Results Pending at Discharge (will require follow up):  HbAic    Medication Changes for Discharge & Rationale:   Aspirin 81 mg daily and Plavix 75 mg daily for 21 days followed by Plavix 75  mg daily.  Lipitor increased to 40 mg daily  See after visit summary for reconciled discharge medications provided to patient and/or family.     Consultations During Hospital Stay:  Neurology    Procedures Performed:   Echo left ventricular cavity size is normal. Wall thickness is normal. There is moderate to severe concentric hypertrophy. The left ventricular ejection fraction is 70%. Systolic function is vigorous. Wall motion is normal. Diastolic function is mildly abnormal, consistent with grade I (abnormal) relaxation.    Atrial Septum: Interatrial septum is aneurysmal with small PFO and right-to-left shunt noted.    Significant Findings / Test Results:   No acute intracranial pathology.  Chronic microangiopathy.       Incidental Findings:   NA       Hospital Course:   Krys Carson is a 64 y.o. female patient who originally presented to the hospital on 5/23/2025 due to strokelike symptoms with transient slurring of speech and right upper extremity numbness.  Underwent stroke workup including MRI of the brain which was negative for stroke.  Echocardiogram showed normal ejection fraction, grade 1 diastolic dysfunction, small PFO and anterior atrial septum with right-to-left shunt.  Findings were discussed with neurologist who recommended aspirin and Plavix for 21 days followed by Plavix monotherapy.  Statin was increased to 40 mg daily of Lipitor.  Patient neurosymptoms had resolved at the time of admission.  Blood pressure remained well-controlled.  Recommended discharge on above medications with outpatient cardiology follow-up for Zio patch.  Stable for discharge home today.          Please see above list of diagnoses and related plan for additional information.     Discharge Day Visit / Exam:   Subjective: Seen and examined at bedside.  Does not offer any complaints with no acute events overnight.  Vitals: Blood Pressure: 140/75 (05/24/25 1039)  Pulse: (!) 54 (05/24/25 1039)  Temperature: 97.5 °F (36.4 °C)  "(05/24/25 1039)  Temp Source: Temporal (05/24/25 0800)  Respirations: 18 (05/24/25 0800)  Height: 5' 7\" (170.2 cm) (05/23/25 1506)  Weight - Scale: 129 kg (285 lb) (05/23/25 1506)  SpO2: 91 % (05/24/25 1039)  Physical Exam  Constitutional:       General: She is not in acute distress.  HENT:      Head: Normocephalic.      Nose: Nose normal.      Mouth/Throat:      Mouth: Mucous membranes are moist.     Eyes:      Extraocular Movements: Extraocular movements intact.      Pupils: Pupils are equal, round, and reactive to light.       Cardiovascular:      Rate and Rhythm: Normal rate and regular rhythm.   Pulmonary:      Effort: Pulmonary effort is normal.   Abdominal:      General: Abdomen is flat.      Palpations: Abdomen is soft.     Musculoskeletal:      Right lower leg: No edema.      Left lower leg: No edema.     Skin:     General: Skin is warm.     Neurological:      General: No focal deficit present.      Mental Status: She is alert and oriented to person, place, and time. Mental status is at baseline.     Psychiatric:         Mood and Affect: Mood normal.          Discussion with Family: Updated  () at bedside.    Discharge instructions/Information to patient and family:   See after visit summary for information provided to patient and family.      Provisions for Follow-Up Care:  See after visit summary for information related to follow-up care and any pertinent home health orders.      Mobility at time of Discharge:   Basic Mobility Inpatient Raw Score: 24  JH-HLM Goal: 8: Walk 250 feet or more  JH-HLM Achieved: 8: Walk 250 feet ot more  HLM Goal achieved. Continue to encourage appropriate mobility.     Disposition:   Home    Planned Readmission: No    Administrative Statements   Discharge Statement:  I have spent a total time of >30 minutes in caring for this patient on the day of the visit/encounter. >30 minutes of time was spent on: Diagnostic results, Prognosis, Risks and benefits of tx " options, Instructions for management, Patient and family education, Importance of tx compliance, Risk factor reductions, Impressions, Counseling / Coordination of care, Documenting in the medical record, Reviewing / ordering tests, medicine, procedures  , and Communicating with other healthcare professionals .    **Please Note: This note may have been constructed using a voice recognition system**

## 2025-05-24 NOTE — UTILIZATION REVIEW
NOTIFICATION OF INPATIENT ADMISSION   AUTHORIZATION REQUEST   SERVICING FACILITY:   [place] [address]  Tax ID: [id number]  NPI: [id number] ATTENDING PROVIDER:  Attending Name and NPI#: Matilde Woodruff Md [[id number]]  Address: [address] [place] [address]  Phone: [phone]   ADMISSION INFORMATION:  Place of Service: Inpatient Acute [facility]  Place of Service Code: 21  Inpatient Admission Date/Time: 5/23/25  1:31 PM  Discharge Date/Time: 5/24/2025 12:31 PM  Admitting Diagnosis Code/Description:  TIA (transient ischemic attack) [G45.9]  Stroke (cerebrum) (HCC) [I63.9]  Right sided numbness [R20.0]     UTILIZATION REVIEW CONTACT:  Miya Ricks, Utilization   Network Utilization Review Department  Phone: [phone]  Fax [phone]  Email: [email or web address]  Contact for approvals/pending authorizations, clinical reviews, and discharge.     PHYSICIAN ADVISORY SERVICES:  Medical Necessity Denial & [place] Review  Phone: [phone]  Fax: [phone]  Email: [email or web address]     DISCHARGE SUPPORT TEAM:  For Patients Discharge Needs & Updates  Phone: [phone] opt. 2 Fax: [phone]  Email: [email or web address]

## 2025-05-24 NOTE — CASE MANAGEMENT
Case Management Assessment & Discharge Planning Note    Patient name Krys Carson  Location /-01 MRN [id number]  : 1960 Date 2025       Current Admission Date: 2025  Current Admission Diagnosis:Stroke-like symptoms   Patient Active Problem List    Diagnosis Date Noted    Stroke-like symptoms 2025    Morbid obesity (HCC) 2025    Hypertension 2025      LOS (days): 1  Geometric Mean LOS (GMLOS) (days):   Days to GMLOS:     OBJECTIVE:    Risk of Unplanned Readmission Score: 8.2         Current admission status: Inpatient  Referral Reason: Stroke    Preferred Pharmacy:   [id number] - [place], [place] - [address]  [address]  [facility] [address]  Phone: [phone] Fax: [phone]    Primary Care Provider: Ranjit Diaz MD    Primary Insurance: [employer]  Secondary Insurance:     ASSESSMENT:  Active Health Care Proxies    There are no active Health Care Proxies on file.       Advance Directives  Does patient have a Health Care POA?: No  Was patient offered paperwork?: Yes  Does patient currently have a Health Care decision maker?: Yes, please see Health Care Proxy section  Does patient have Advance Directives?: No  Was patient offered paperwork?: Yes  Primary Contact: Spouse - Jake         Readmission Root Cause  30 Day Readmission: No    Patient Information  Admitted from:: Home  Mental Status: Alert  During Assessment patient was accompanied by: Spouse  Assessment information provided by:: Patient, Spouse  Primary Caregiver: Self  Support Systems: Spouse/significant other, Children  County of Residence: [facility]  What city do you live in?: [place]  Home entry access options. Select all that apply.: Stairs, Garage  Number of steps to enter home.: 7  Do the steps have railings?: Yes  Type of Current Residence: Bi-level  Upon entering residence, is there a bedroom on the main floor (no further steps)?: No  A bedroom is located  on the following floor levels of residence (select all that apply):: 2nd Floor  Upon entering residence, is there a bathroom on the main floor (no further steps)?: Yes  Number of steps to 2nd floor from main floor: 6  Living Arrangements: Lives w/ Spouse/significant other  Is patient a ?: No    Activities of Daily Living Prior to Admission  Functional Status: Independent  Completes ADLs independently?: Yes  Ambulates independently?: Yes  Does patient use assisted devices?: No  Does patient currently own DME?: No (bathroom on the 1st floor has a shower bench built in)  Does patient have a history of Outpatient Therapy (PT/OT)?: Yes ([place])  Does the patient have a history of Short-Term Rehab?: No  Does patient have a history of HHC?: No  Does patient currently have HHC?: No         Patient Information Continued  Income Source: Employed ([employer] - FT - 2 days in person the rest at home)  Does patient have prescription coverage?: Yes  Can the patient afford their medications and any related supplies (such as glucometers or test strips)?: Yes  Does patient receive dialysis treatments?: No  Does patient have a history of substance abuse?: No  Does patient have a history of Mental Health Diagnosis?: No         Means of Transportation  Means of Transport to Appts:: Drives Self          DISCHARGE DETAILS:    Discharge planning discussed with:: patient, spouse  Freedom of Choice: Yes  Comments - Freedom of Choice: no needs  CM contacted family/caregiver?: Yes  Were Treatment Team discharge recommendations reviewed with patient/caregiver?: Yes  Did patient/caregiver verbalize understanding of patient care needs?: Yes  Were patient/caregiver advised of the risks associated with not following Treatment Team discharge recommendations?: Yes    Contacts  Patient Contacts: Jake (Spouse)  Relationship to Patient:: Family  Contact Method: In Person  Reason/Outcome: Continuity of Care, Discharge Planning    Requested  Home Health Care         Is the patient interested in HHC at discharge?: No    DME Referral Provided  Referral made for DME?: No    Other Referral/Resources/Interventions Provided:  Interventions: None Indicated    Would you like to participate in our [facility] service program?  : No - Declined    Treatment Team Recommendation: Home  Discharge Destination Plan:: Home  Transport at Discharge : Family       CM met with patient/spouse at the bedside,baseline information  was obtained. CM discussed the role of CM in helping the patient develop a discharge plan and assist the patient in carry out their plan.  Patient was IPTA, uses no DME at baseline for ambulation, and has no hx of rehab/therapy.   Patient lives in split level home - typically enter in garage with 7 SEAN the 1st floor where main floor/bathroom is and another 6 SEAN 2nd floor where bedroom is.   Patient works FT and drives.  CM discussed discharge planning - at this time patient has no anticipated CM needs.       Case was NOT discussed in multi disciplinary rounds due to weekend but CM spoke to both nursing, PT and DR regarding patient's care.   Plan is MRI brain negative for acute stroke or bleed  DC recommendation is Home - no therapy needs  CM will continue to follow.

## 2025-05-24 NOTE — ASSESSMENT & PLAN NOTE
Patient presented with transient right upper extremity numbness and slurred speech while talking to her   Symptoms resolved within 10 minutes  Neuroexam nonfocal  NIHSS on admission 0  CTA head and neckNo significant stenosis of the cervical carotid or vertebral arteries  No significant intracranial stenosis, large vessel occlusion or aneurysm.  MRI brain negative for acute stroke or bleed  Symptoms likely secondary to TIA  Discussed with neurology recommended DAPT for 21 days followed by Plavix monotherapy  Continue with high intensity statin.   Echocardiogram with small PFO with right-to-left shunt   Ambulated well with PT --no needs  Neurology input noted and appreciated  Outpatient Zio patch.  Discussed with cardiology.  Referral provided upon discharge  Stable for discharge home today

## 2025-05-24 NOTE — PLAN OF CARE
Problem: Potential for Falls  Goal: Patient will remain free of falls  Description: INTERVENTIONS:  - Educate patient/family on patient safety including physical limitations  - Instruct patient to call for assistance with activity   - Consider consulting OT/PT to assist with strengthening/mobility based on AM PAC & JH-HLM score  - Consult OT/PT to assist with strengthening/mobility   - Keep Call bell within reach  - Keep bed low and locked with side rails adjusted as appropriate  - Keep care items and personal belongings within reach  - Initiate and maintain comfort rounds  - Make Fall Risk Sign visible to staff  - Offer Toileting every  Hours, in advance of need  - Initiate/Maintain alarm  - Obtain necessary fall risk management equipment:   - Apply yellow socks and bracelet for high fall risk patients  - Consider moving patient to room near nurses station  Outcome: Progressing     Problem: CARDIOVASCULAR - ADULT  Goal: Maintains optimal cardiac output and hemodynamic stability  Description: INTERVENTIONS:  - Monitor I/O, vital signs and rhythm  - Monitor for S/S and trends of decreased cardiac output  - Administer and titrate ordered vasoactive medications to optimize hemodynamic stability  - Assess quality of pulses, skin color and temperature  - Assess for signs of decreased coronary artery perfusion  - Instruct patient to report change in severity of symptoms  Outcome: Progressing

## 2025-05-24 NOTE — UTILIZATION REVIEW
Initial Clinical Review    Admission: Date/Time/Statement:   Admission Orders (From admission, onward)       Ordered        05/23/25 1331  INPATIENT ADMISSION  Once                          Orders Placed This Encounter   Procedures    INPATIENT ADMISSION     Standing Status:   Standing     Number of Occurrences:   1     Level of Care:   Med Surg [16]     Estimated length of stay:   More than 2 Midnights     Certification:   I certify that inpatient services are medically necessary for this patient for a duration of greater than two midnights. See H&P and MD Progress Notes for additional information about the patient's course of treatment.     ED Arrival Information       Expected   5/23/2025 12:22    Arrival   5/23/2025 12:40    Acuity   Emergent              Means of arrival   Ambulance    Escorted by   [facility]    Service   Hospitalist    Admission type   Emergency              Arrival complaint   pre hospital stroke             Chief Complaint   Patient presents with    STROKE Alert     Pt started with R sided headache, R arm pain, R hadn pain. Slurred speech. Pt went to  and they called 911. When EMS arrived all symptoms have resolved        Initial Presentation: 64 y.o. female to ED presents for Stroke like symptoms. Per pt, this am at around 10:30 AM she was sitting down talking to her  when she suddenly felt numbness starting from her fingers going up her arm and her right half of the face. She also had slurring of speech alongside with that. This episode lasted for 10 minutes and subsequently started getting better. Symptoms have resolved by the time came to ED. Initially seen at Urgent Care and sent to ED as Prehospital Stroke Alert. Pt also had some dull headache subsequently.   PMH of hypertension, hyperlipidemia, Mobid obesity   Admit to Inpatient Dx; Stroke-like symptoms, BP elevated in ED  Plan; MRI Brain. Echo. High intensity statin. Neurology consult. Tele monitoring.   Lipid panel  and A1c. Neuro checks Every 1 hour x 4 hours, then every 2 hours x 8 hours, then every 4 hours x 72 hours.    Speech consult.   Continue amlodipine and metoprolol. Outpt Zio patch.     5/23  Neurology Telemed; Stroke Alert with R sided headache, R arm pain, and right hand pain while she was at work today, symptoms started 40-45 minutes prior to ED arrival, but by the time she arrived to the ER her symptoms resolved.  Not on antiplatelet therapy at home. Pt still with slight headache.  Stroke workup. MRI Brain. Echo. Neuro checks.   DAPT for 3 weeks, and then aspirin monotherapy afterwards  If echo negative, zio patch     Anticipated Length of Stay/Certification Statement: Patient will be admitted on an inpatient basis with an anticipated length of stay of greater than 2 midnights secondary to stroke workup     Date: 5/24   Day 2:   MRI negative for acute stroke. Symptoms likely secondary to TIA   Echo with small PFO with right-to-left shunt. Aspirin 81 mg daily and Plavix 75 mg daily for 21 days followed by Plavix 75 mg daily. Lipitor increased to 40 mg daily   Outpt Zio patch. BP improved.     ED Treatment-Medication Administration from 05/23/2025 1224 to 05/23/2025 1448         Date/Time Order Dose Route Action     05/23/2025 1259 iohexol (OMNIPAQUE) 350 MG/ML injection (MULTI-DOSE) 85 mL 85 mL Intravenous Given     05/23/2025 1329 aspirin chewable tablet 324 mg 324 mg Oral Given     05/23/2025 1329 clopidogrel (PLAVIX) tablet 300 mg 300 mg Oral Given     05/23/2025 1405 LORazepam (ATIVAN) injection 1 mg 1 mg Intravenous Given            Scheduled Medications:  amLODIPine, 5 mg, Oral, Daily  aspirin, 81 mg, Oral, Daily  atorvastatin, 40 mg, Oral, Daily With Dinner  Cholecalciferol, 2,000 Units, Oral, Daily  clopidogrel, 75 mg, Oral, Once  enoxaparin, 40 mg, Subcutaneous, Q12H DYLAN  metoprolol tartrate, 25 mg, Oral, Q12H DYLAN      Continuous IV Infusions: None     PRN Meds:  acetaminophen, 650 mg, Oral, Q6H  PRN      ED Triage Vitals [05/23/25 1245]   Temperature Pulse Respirations Blood Pressure SpO2 Pain Score   98.5 °F (36.9 °C) 75 18 (!) 205/107 97 % No Pain     Weight (last 2 days)       Date/Time Weight    05/23/25 1506 129 (285)    05/23/25 1300 129 (285.28)            Vital Signs (last 3 days)       Date/Time Temp Pulse Resp BP MAP (mmHg) SpO2 O2 Device Patient Position - Orthostatic VS Moe Coma Scale Score Pain    05/24/25 10:39:44 97.5 °F (36.4 °C) 54 -- 140/75 97 91 % -- -- -- --    05/24/25 1030 -- -- -- -- -- -- -- -- 15 --    05/24/25 0838 -- -- -- -- -- 95 % None (Room air) -- 15 No Pain    05/24/25 0800 97.3 °F (36.3 °C) 61 18 130/72 -- 94 % -- -- -- --    05/24/25 0630 97.3 °F (36.3 °C) 61 18 130/72 91 94 % None (Room air) Lying 15 --    05/24/25 06:22:13 -- -- -- -- -- 94 % -- -- -- --    05/24/25 0230 97.3 °F (36.3 °C) 60 18 130/71 91 92 % None (Room air) Lying 15 --    05/24/25 0030 97.2 °F (36.2 °C) 65 18 132/71 91 90 % None (Room air) Lying 15 --    05/24/25 0000 97.2 °F (36.2 °C) 65 18 132/71 -- 90 % -- -- -- --    05/23/25 2230 97.2 °F (36.2 °C) 58 18 112/62 -- 94 % None (Room air) Lying 15 --    05/23/25 2200 97.2 °F (36.2 °C) 70 18 114/64 81 93 % None (Room air) Lying -- --    05/23/25 2000 97.5 °F (36.4 °C) 70 18 122/72 -- 93 % None (Room air) Sitting 15 No Pain    05/23/25 1811 -- -- -- -- -- 96 % -- -- -- --    05/23/25 18:10:21 -- 70 -- -- -- 96 % -- -- -- --    05/23/25 18:00:40 96.6 °F (35.9 °C) 75 16 152/76 -- 96 % -- -- -- --    05/23/25 1800 -- -- -- -- -- -- -- -- 15 --    05/23/25 1700 97.5 °F (36.4 °C) 75 16 160/88 112 95 % -- -- 15 --    05/23/25 16:43:58 -- -- -- -- -- 97 % -- -- -- --    05/23/25 1600 97.3 °F (36.3 °C) 80 16 144/76 -- -- -- -- 15 --    05/23/25 1557 -- -- -- -- -- 97 % -- -- -- --    05/23/25 1506 -- -- -- -- -- -- -- -- -- No Pain    05/23/25 15:03:40 -- -- -- -- -- 97 % -- -- -- --    05/23/25 1500 97.3 °F (36.3 °C) 69 18 149/78 -- 96 % None (Room air) --  15 --    05/23/25 1407 97.5 °F (36.4 °C) 70 18 122/72 -- 93 % -- -- -- --    05/23/25 1400 -- -- -- -- -- -- -- -- 15 --    05/23/25 1345 -- 71 20 147/85 106 97 % None (Room air) Sitting 15 --    05/23/25 1330 -- -- -- -- -- 98 % -- -- 15 --    05/23/25 1315 -- 78 18 203/86 -- 96 % None (Room air) -- 15 --    05/23/25 1300 -- 80 18 203/86 -- 98 % -- -- 15 No Pain    05/23/25 1245 98.5 °F (36.9 °C) 75 18 205/107 -- 97 % None (Room air) -- 15 No Pain              Pertinent Labs/Diagnostic Test Results:   Radiology:  MRI brain wo contrast   Final Interpretation by E. Alec Schoenberger, MD (05/23 1450)   No acute intracranial pathology.   Chronic microangiopathy.      Workstation performed: [id number]         CT stroke alert brain   Final Interpretation by E. Alec Schoenberger, MD (05/23 1254)   No acute infarct, hemorrhage or mass.   Chronic appearing lacunar infarcts and mild chronic microangiopathy.      Findings were reported to Sherrie Oneal   via HIPAA compliant secure electronic messaging at 12:50 p.m.         Workstation performed: [id number]         CTA stroke alert (head/neck)   Final Interpretation by E. Alec Schoenberger, MD (05/23 1310)   No significant stenosis of the cervical carotid or vertebral arteries   No significant intracranial stenosis, large vessel occlusion or aneurysm.         Findings were directly discussed with Sherrie Oneal  at 1:05 p.m.      Workstation performed: [id number]           Cardiology:  Echo complete w/ contrast if indicated   Final Result by Osiel Jj MD (05/23 6922)        Left Ventricle: Left ventricular cavity size is normal. Wall thickness    is normal. There is moderate to severe concentric hypertrophy. The left    ventricular ejection fraction is 70%. Systolic function is vigorous. Wall    motion is normal. Diastolic function is mildly abnormal, consistent with    grade I (abnormal) relaxation.     Atrial Septum: Interatrial septum is aneurysmal with small PFO and   "  right-to-left shunt noted.     Mitral Valve: There is mild regurgitation.     Pulmonic Valve: There is mild regurgitation.         ECG 12 lead   Final Result by Osiel Jj MD (05/23 1416)   Normal sinus rhythm   Cannot rule out Anterior infarct , age undetermined   Abnormal ECG   No previous ECGs available   Confirmed by Osiel Jj ([id number]) on 5/23/2025 2:16:29 PM        GI:  No orders to display           Results from last 7 days   Lab Units 05/24/25  0452 05/23/25  1311   WBC Thousand/uL 7.31 7.83   HEMOGLOBIN g/dL 13.6 14.9   HEMATOCRIT % 41.0 45.3   PLATELETS Thousands/uL 191 200   TOTAL NEUT ABS Thousands/µL 3.95  --          Results from last 7 days   Lab Units 05/24/25  0452 05/23/25  1252   SODIUM mmol/L 138 138   POTASSIUM mmol/L 4.0 4.3   CHLORIDE mmol/L 105 103   CO2 mmol/L 27 26   ANION GAP mmol/L 6 9   BUN mg/dL 13 10   CREATININE mg/dL 0.57* 0.66   EGFR ml/min/1.73sq m 98 94   CALCIUM mg/dL 8.7 9.5         Results from last 7 days   Lab Units 05/23/25  1242   POC GLUCOSE mg/dl 172*     Results from last 7 days   Lab Units 05/24/25  0452 05/23/25  1252   GLUCOSE RANDOM mg/dL 179* 197*             No results found for: \"BETA-HYDROXYBUTYRATE\"                   Results from last 7 days   Lab Units 05/23/25  1731 05/23/25  1530 05/23/25  1252   HS TNI 0HR ng/L  --   --  3   HS TNI 2HR ng/L  --  5  --    HSTNI D2 ng/L  --  2  --    HS TNI 4HR ng/L 5  --   --    HSTNI D4 ng/L 2  --   --          Results from last 7 days   Lab Units 05/23/25  1324   PROTIME seconds 12.8   INR  0.92   PTT seconds 25         Past Medical History[1]  Present on Admission:  **None**      Admitting Diagnosis: TIA (transient ischemic attack) [G45.9]  Stroke (cerebrum) (HCC) [I63.9]  Right sided numbness [R20.0]  Age/Sex: 64 y.o. female    Network Utilization Review Department  ATTENTION: Please call with any questions or concerns to [phone] and carefully listen to the prompts so that you are directed to the right person. " All voicemails are confidential.   For Discharge needs, contact Care Management DC Support Team at [phone] opt. 2  Send all requests for admission clinical reviews, approved or denied determinations and any other requests to dedicated fax number below belonging to the campus where the patient is receiving treatment. List of dedicated fax numbers for the Facilities:  FACILITY NAME UR FAX NUMBER   ADMISSION DENIALS (Administrative/Medical Necessity) [phone]   DISCHARGE SUPPORT TEAM (NETWORK) [phone]   PARENT CHILD HEALTH (Maternity/NICU/Pediatrics) [phone]   [facility] [phone]   [facility] [phone]   [facility] [phone]   [facility] [phone]   [facility] [phone]   [facility] [phone]   [facility] [phone]   [facility] [phone]   [facility] [phone]   [facility] [phone]   [facility] [phone]   [facility] [phone]              [1]   Past Medical History:  Diagnosis Date    Arthritis     Hypertension

## 2025-05-24 NOTE — ASSESSMENT & PLAN NOTE
Blood pressure elevated on admission.  Now that acute stroke has been ruled out, will continue with amlodipine and metoprolol.  May need further titration of medications.  Blood pressure currently well-controlled

## 2025-05-27 NOTE — UTILIZATION REVIEW
NOTIFICATION OF ADMISSION DISCHARGE   This is a Notification of Discharge from [facility]. Please be advised that this patient has been discharge from our facility. Below you will find the admission and discharge date and time including the patient’s disposition.   UTILIZATION REVIEW CONTACT:  Utilization Review Assistants  Network Utilization Review Department  Phone: [phone] x carefully listen to the prompts. All voicemails are confidential.  Email: [email or web address]     ADMISSION INFORMATION  PRESENTATION DATE: 5/23/2025 12:40 PM  OBERVATION ADMISSION DATE: N/A  INPATIENT ADMISSION DATE: 5/23/25  1:31 PM   DISCHARGE DATE: 5/24/2025 12:31 PM   DISPOSITION:Home/Self Care    Network Utilization Review Department  ATTENTION: Please call with any questions or concerns to [phone] and carefully listen to the prompts so that you are directed to the right person. All voicemails are confidential.   For Discharge needs, contact Care Management DC Support Team at [phone] opt. 2  Send all requests for admission clinical reviews, approved or denied determinations and any other requests to dedicated fax number below belonging to the campus where the patient is receiving treatment. List of dedicated fax numbers for the Facilities:  FACILITY NAME UR FAX NUMBER   ADMISSION DENIALS (Administrative/Medical Necessity) [phone]   DISCHARGE SUPPORT TEAM ([facility]) [phone]   PARENT CHILD HEALTH (Maternity/NICU/Pediatrics) [phone]   [facility] [phone]   [facility] [phone]   [facility] [phone]   [facility] [phone]   [facility] [phone]   [facility] [phone]   [facility] [phone]   [facility] [phone]   [facility]  [facility] [phone]   [facility] [phone]   [facility] [phone]   [facility] [phone]

## 2025-08-05 ENCOUNTER — TELEPHONE (OUTPATIENT)
Age: 65
End: 2025-08-05